# Patient Record
Sex: MALE | Race: WHITE | NOT HISPANIC OR LATINO | Employment: OTHER | URBAN - METROPOLITAN AREA
[De-identification: names, ages, dates, MRNs, and addresses within clinical notes are randomized per-mention and may not be internally consistent; named-entity substitution may affect disease eponyms.]

---

## 2024-01-03 ENCOUNTER — APPOINTMENT (EMERGENCY)
Dept: CT IMAGING | Facility: HOSPITAL | Age: 74
End: 2024-01-03
Payer: MEDICARE

## 2024-01-03 ENCOUNTER — HOSPITAL ENCOUNTER (EMERGENCY)
Facility: HOSPITAL | Age: 74
Discharge: HOME | End: 2024-01-03
Attending: EMERGENCY MEDICINE
Payer: MEDICARE

## 2024-01-03 VITALS
SYSTOLIC BLOOD PRESSURE: 135 MMHG | RESPIRATION RATE: 18 BRPM | OXYGEN SATURATION: 92 % | TEMPERATURE: 98.3 F | DIASTOLIC BLOOD PRESSURE: 93 MMHG | WEIGHT: 229.94 LBS | HEART RATE: 70 BPM | HEIGHT: 69 IN | BODY MASS INDEX: 34.06 KG/M2

## 2024-01-03 DIAGNOSIS — G89.29 CHRONIC PAIN: ICD-10-CM

## 2024-01-03 DIAGNOSIS — M54.9 LEFT-SIDED BACK PAIN: Primary | ICD-10-CM

## 2024-01-03 DIAGNOSIS — M54.10 RADICULOPATHY: ICD-10-CM

## 2024-01-03 LAB
ANION GAP SERPL CALCULATED.3IONS-SCNC: 6 MMOL/L
BASOPHILS # BLD AUTO: 0.05 THOUSANDS/ÂΜL (ref 0–0.1)
BASOPHILS NFR BLD AUTO: 1 % (ref 0–1)
BUN SERPL-MCNC: 19 MG/DL (ref 5–25)
CALCIUM SERPL-MCNC: 9 MG/DL (ref 8.4–10.2)
CHLORIDE SERPL-SCNC: 103 MMOL/L (ref 96–108)
CO2 SERPL-SCNC: 27 MMOL/L (ref 21–32)
CREAT SERPL-MCNC: 0.95 MG/DL (ref 0.6–1.3)
EOSINOPHIL # BLD AUTO: 0.27 THOUSAND/ÂΜL (ref 0–0.61)
EOSINOPHIL NFR BLD AUTO: 3 % (ref 0–6)
ERYTHROCYTE [DISTWIDTH] IN BLOOD BY AUTOMATED COUNT: 14.8 % (ref 11.6–15.1)
GFR SERPL CREATININE-BSD FRML MDRD: 80 ML/MIN/1.73SQ M
GLUCOSE SERPL-MCNC: 108 MG/DL (ref 65–140)
HCT VFR BLD AUTO: 49.8 % (ref 36.5–49.3)
HGB BLD-MCNC: 16.3 G/DL (ref 12–17)
IMM GRANULOCYTES # BLD AUTO: 0.05 THOUSAND/UL (ref 0–0.2)
IMM GRANULOCYTES NFR BLD AUTO: 1 % (ref 0–2)
LYMPHOCYTES # BLD AUTO: 1.59 THOUSANDS/ÂΜL (ref 0.6–4.47)
LYMPHOCYTES NFR BLD AUTO: 15 % (ref 14–44)
MCH RBC QN AUTO: 29.3 PG (ref 26.8–34.3)
MCHC RBC AUTO-ENTMCNC: 32.7 G/DL (ref 31.4–37.4)
MCV RBC AUTO: 89 FL (ref 82–98)
MONOCYTES # BLD AUTO: 1.05 THOUSAND/ÂΜL (ref 0.17–1.22)
MONOCYTES NFR BLD AUTO: 10 % (ref 4–12)
NEUTROPHILS # BLD AUTO: 7.79 THOUSANDS/ÂΜL (ref 1.85–7.62)
NEUTS SEG NFR BLD AUTO: 70 % (ref 43–75)
NRBC BLD AUTO-RTO: 0 /100 WBCS
PLATELET # BLD AUTO: 181 THOUSANDS/UL (ref 149–390)
PMV BLD AUTO: 10.9 FL (ref 8.9–12.7)
POTASSIUM SERPL-SCNC: 4.6 MMOL/L (ref 3.5–5.3)
RBC # BLD AUTO: 5.57 MILLION/UL (ref 3.88–5.62)
SODIUM SERPL-SCNC: 136 MMOL/L (ref 135–147)
WBC # BLD AUTO: 10.8 THOUSAND/UL (ref 4.31–10.16)

## 2024-01-03 PROCEDURE — 36415 COLL VENOUS BLD VENIPUNCTURE: CPT | Performed by: EMERGENCY MEDICINE

## 2024-01-03 PROCEDURE — 70498 CT ANGIOGRAPHY NECK: CPT

## 2024-01-03 PROCEDURE — 72131 CT LUMBAR SPINE W/O DYE: CPT

## 2024-01-03 PROCEDURE — 70496 CT ANGIOGRAPHY HEAD: CPT

## 2024-01-03 PROCEDURE — G1004 CDSM NDSC: HCPCS

## 2024-01-03 PROCEDURE — 96374 THER/PROPH/DIAG INJ IV PUSH: CPT

## 2024-01-03 PROCEDURE — 99283 EMERGENCY DEPT VISIT LOW MDM: CPT

## 2024-01-03 PROCEDURE — 99284 EMERGENCY DEPT VISIT MOD MDM: CPT | Performed by: EMERGENCY MEDICINE

## 2024-01-03 PROCEDURE — 85025 COMPLETE CBC W/AUTO DIFF WBC: CPT | Performed by: EMERGENCY MEDICINE

## 2024-01-03 PROCEDURE — 80048 BASIC METABOLIC PNL TOTAL CA: CPT | Performed by: EMERGENCY MEDICINE

## 2024-01-03 RX ORDER — KETOROLAC TROMETHAMINE 30 MG/ML
30 INJECTION, SOLUTION INTRAMUSCULAR; INTRAVENOUS ONCE
Status: DISCONTINUED | OUTPATIENT
Start: 2024-01-03 | End: 2024-01-03

## 2024-01-03 RX ORDER — KETOROLAC TROMETHAMINE 30 MG/ML
30 INJECTION, SOLUTION INTRAMUSCULAR; INTRAVENOUS ONCE
Status: COMPLETED | OUTPATIENT
Start: 2024-01-03 | End: 2024-01-03

## 2024-01-03 RX ORDER — DIAZEPAM 5 MG/1
5 TABLET ORAL ONCE
Status: COMPLETED | OUTPATIENT
Start: 2024-01-03 | End: 2024-01-03

## 2024-01-03 RX ORDER — ACETAMINOPHEN 325 MG/1
975 TABLET ORAL ONCE
Status: COMPLETED | OUTPATIENT
Start: 2024-01-03 | End: 2024-01-03

## 2024-01-03 RX ORDER — LIDOCAINE 50 MG/G
1 PATCH TOPICAL ONCE
Status: DISCONTINUED | OUTPATIENT
Start: 2024-01-03 | End: 2024-01-03 | Stop reason: HOSPADM

## 2024-01-03 RX ADMIN — IOHEXOL 85 ML: 350 INJECTION, SOLUTION INTRAVENOUS at 07:25

## 2024-01-03 RX ADMIN — LIDOCAINE 1 PATCH: 700 PATCH TOPICAL at 05:37

## 2024-01-03 RX ADMIN — KETOROLAC TROMETHAMINE 30 MG: 30 INJECTION, SOLUTION INTRAMUSCULAR at 08:57

## 2024-01-03 RX ADMIN — DIAZEPAM 5 MG: 5 TABLET ORAL at 05:37

## 2024-01-03 RX ADMIN — ACETAMINOPHEN 975 MG: 325 TABLET, FILM COATED ORAL at 05:37

## 2024-01-03 NOTE — ED CARE HANDOFF
Emergency Department Sign Out Note        Sign out and transfer of care from Dr. Paula. See Separate Emergency Department note.     The patient, Beto Sandy, was evaluated by the previous provider for chronic pian.    Workup Completed:  CBC and BMP    ED Course / Workup Pending (followup):  Pending CT lumbar spine and CTA head and neck          ED Course as of 01/05/24 1200 Wed Jan 03, 2024   0653 SO: 72 y/o male with hx of HTN, bipolar, back pain. Presents from SNF for chronic left sided pain. Imaging pending. If normal can discharge back to SNF.    0821 CTA head and neck with and without contrast  No acute intracranial pathology. Mild chronic microangiopathy.  No significant stenosis of the cervical carotid or vertebral arteries.  No significant intracranial stenosis, large vessel occlusion or aneurysm.     0833 CT lumbar spine without contrast  No acute fracture. Severe multilevel degenerative spondylosis similar to outside report for outside CT.   0845 Patient reevaluated and updated on the results of all diagnostic testing.  Patient reports continued back pain.  No red flag signs.  Physical exam is unremarkable.  Will give the patient a dose of Toradol and discharge with outpatient follow-up and return precautions.  Patient is in agreement with the plan.             Procedures  Medical Decision Making  Amount and/or Complexity of Data Reviewed  Labs: ordered.  Radiology: ordered. Decision-making details documented in ED Course.    Risk  OTC drugs.  Prescription drug management.            Disposition  Final diagnoses:   Left-sided back pain   Radiculopathy   Chronic pain     Time reflects when diagnosis was documented in both MDM as applicable and the Disposition within this note       Time User Action Codes Description Comment    1/3/2024  6:47 AM Eduard Paula [M54.9] Left-sided back pain     1/3/2024  6:47 AM Eduard Paula [M54.10] Radiculopathy     1/3/2024  6:47 AM Eduard Paula [G89.29]  Chronic pain           ED Disposition       ED Disposition   Discharge    Condition   Stable    Date/Time   Wed Brett 3, 2024  8:35 AM    Rikki Sandy discharge to home/self care.                   Follow-up Information       Follow up With Specialties Details Why Contact Info Additional Information    [facility] Emergency Department Emergency Medicine Go to  If symptoms worsen [address] [place] [id number]  [phone] [facility] Emergency Department, [address] [place] [phone]          There are no discharge medications for this patient.    No discharge procedures on file.       ED Provider  Electronically Signed by     Adan Lovett MD  01/05/24 1200

## 2024-01-03 NOTE — ED PROVIDER NOTES
"History  Chief Complaint   Patient presents with    Pain     Pt called EMS from his SNF for stroke-like symptoms. On arrival, EMS reports no signs or symptoms of stroke, and pt only reports generalized L sided chronic pain. Pt has history of L sided generalized pin, at baseline. Pt states his pain is currently \"a 10/10, it's always a 10/10.\"      73-year-old male with hx of HTN, HLD, bipolar disorder, schizophrenia, mild intellectual disability, CAD, seizure disorder, TIA, spinal stenosis, and LLE radiculopathy presents to the ED via EMS from nursing facility for evaluation of left-sided body pain. Per EMS report, the patient is currently in a nursing facility, however he called 911 for evaluation due to reported stroke-like symptoms.  When EMS arrived, the nursing facility staff were unaware that the patient had called 911 and on EMS evaluation the patient reported left-sided body pain that is chronic in nature and reported no focal weakness, numbness, or other stroke-like symptoms.  Review of the patient's electronic medical record reveals that he was recently admitted at [facility] for similar symptoms; during that admission he was evaluated by neurosurgery (recommended PT/OT and no surgery for his spinal stenosis and LLE radiculopathy) as well as cardiology and neurology (stroke-like symptoms, work up was negative for acute CVA and rule out).  Patient states that he has been having pain on the left side of the body from his head to his feet and that he rates it 10/10, noting that \"it is always a 10/10.\"  His pain is worse with movement and with raising of his left leg.  He denies any new numbness or weakness.  No fever, chills, cough, dyspnea, chest pain, abdominal pain, nausea, vomiting, diarrhea, bowel retention/incontinence, urinary retention/incontinence, rashes, headache, dizziness, or visual changes.        None       History reviewed. No pertinent past medical history.    History reviewed. No " pertinent surgical history.    History reviewed. No pertinent family history.  I have reviewed and agree with the history as documented.    E-Cigarette/Vaping     E-Cigarette/Vaping Substances    Nicotine No     THC No     CBD No     Flavoring No     Other No     Unknown No      Social History     Tobacco Use    Smoking status: Former     Types: Cigarettes    Smokeless tobacco: Never   Substance Use Topics    Alcohol use: Not Currently    Drug use: Not Currently     Types: Marijuana       Review of Systems   Constitutional:  Negative for chills and fever.   HENT:  Negative for congestion, rhinorrhea and sore throat.    Respiratory:  Negative for cough and shortness of breath.    Cardiovascular:  Negative for chest pain and palpitations.   Gastrointestinal:  Negative for abdominal pain, diarrhea, nausea and vomiting.   Genitourinary:  Negative for dysuria and hematuria.   Musculoskeletal:  Positive for back pain and myalgias. Negative for neck pain.   Neurological:  Negative for weakness, light-headedness, numbness and headaches.   All other systems reviewed and are negative.      Physical Exam  Physical Exam  Vitals and nursing note reviewed.   Constitutional:       General: He is not in acute distress.     Appearance: Normal appearance. He is obese. He is not ill-appearing.   HENT:      Head: Normocephalic and atraumatic.      Right Ear: External ear normal.      Left Ear: External ear normal.      Nose: Nose normal.      Mouth/Throat:      Mouth: Mucous membranes are moist.      Pharynx: Oropharynx is clear. No oropharyngeal exudate or posterior oropharyngeal erythema.   Eyes:      Extraocular Movements: Extraocular movements intact.      Conjunctiva/sclera: Conjunctivae normal.      Pupils: Pupils are equal, round, and reactive to light.   Cardiovascular:      Rate and Rhythm: Normal rate and regular rhythm.      Pulses: Normal pulses.      Heart sounds: Normal heart sounds.   Pulmonary:      Effort: Pulmonary  effort is normal. No respiratory distress.      Breath sounds: Normal breath sounds. No wheezing or rales.   Abdominal:      General: Abdomen is flat. Bowel sounds are normal. There is no distension.      Palpations: Abdomen is soft.      Tenderness: There is no abdominal tenderness. There is no right CVA tenderness, left CVA tenderness or guarding.   Musculoskeletal:         General: Tenderness present. No swelling or deformity. Normal range of motion.      Cervical back: Normal range of motion and neck supple. No tenderness.      Comments: Left-sided paralumbar spinal muscle tenderness to palpation with positive straight leg raise and description of radiculopathy radiating down the left leg.  Patient also reports midline lumbar spine tenderness palpation, no step-offs or deformity on exam.  Pelvis is stable to compression bilaterally. Neurovascularly intact distally.    Skin:     General: Skin is warm and dry.   Neurological:      General: No focal deficit present.      Mental Status: He is alert and oriented to person, place, and time.      Cranial Nerves: No cranial nerve deficit.      Sensory: No sensory deficit.      Motor: No weakness.      Comments: The patient is awake, oriented to person, place, and time.  Moving all 4 extremities spontaneously.  No facial asymmetry.  Clear speech without dysarthria.  Cranial nerves II-XII intact and symmetric. NIH SS 0.         Vital Signs  ED Triage Vitals   Temperature Pulse Respirations Blood Pressure SpO2   01/03/24 0453 01/03/24 0453 01/03/24 0453 01/03/24 0453 01/03/24 0453   98.3 °F (36.8 °C) 70 18 135/93 92 %      Temp Source Heart Rate Source Patient Position - Orthostatic VS BP Location FiO2 (%)   01/03/24 0453 01/03/24 0453 01/03/24 0453 01/03/24 0453 --   Oral Monitor Lying Left arm       Pain Score       01/03/24 0537       10 - Worst Possible Pain           Vitals:    01/03/24 0453   BP: 135/93   Pulse: 70   Patient Position - Orthostatic VS: Lying          Visual Acuity      ED Medications  Medications   lidocaine (LIDODERM) 5 % patch 1 patch (1 patch Topical Medication Applied 1/3/24 0537)   acetaminophen (TYLENOL) tablet 975 mg (975 mg Oral Given 1/3/24 0537)   diazepam (VALIUM) tablet 5 mg (5 mg Oral Given 1/3/24 0537)       Diagnostic Studies  Results Reviewed       Procedure Component Value Units Date/Time    Basic metabolic panel [[id number]] Collected: 01/03/24 0611    Lab Status: Final result Specimen: Blood from Arm, Left Updated: 01/03/24 0700     Sodium 136 mmol/L      Potassium 4.6 mmol/L      Chloride 103 mmol/L      CO2 27 mmol/L      ANION GAP 6 mmol/L      BUN 19 mg/dL      Creatinine 0.95 mg/dL      Glucose 108 mg/dL      Calcium 9.0 mg/dL      eGFR 80 ml/min/1.73sq m     Narrative:      National Kid[employer] Foundation guidelines for Chronic Kidney Disease (CKD):     Stage 1 with normal or high GFR (GFR > 90 mL/min/1.73 square meters)    Stage 2 Mild CKD (GFR = 60-89 mL/min/1.73 square meters)    Stage 3A Moderate CKD (GFR = 45-59 mL/min/1.73 square meters)    Stage 3B Moderate CKD (GFR = 30-44 mL/min/1.73 square meters)    Stage 4 Severe CKD (GFR = 15-29 mL/min/1.73 square meters)    Stage 5 End Stage CKD (GFR <15 mL/min/1.73 square meters)  Note: GFR calculation is accurate only with a steady state creatinine    CBC and differential [[id number]]  (Abnormal) Collected: 01/03/24 0611    Lab Status: Final result Specimen: Blood from Arm, Left Updated: 01/03/24 0622     WBC 10.80 Thousand/uL      RBC 5.57 Million/uL      Hemoglobin 16.3 g/dL      Hematocrit 49.8 %      MCV 89 fL      MCH 29.3 pg      MCHC 32.7 g/dL      RDW 14.8 %      MPV 10.9 fL      Platelets 181 Thousands/uL      nRBC 0 /100 WBCs      Neutrophils Relative 70 %      Immat GRANS % 1 %      Lymphocytes Relative 15 %      Monocytes Relative 10 %      Eosinophils Relative 3 %      Basophils Relative 1 %      Neutrophils Absolute 7.79 Thousands/µL      Immature Grans Absolute  0.05 Thousand/uL      Lymphocytes Absolute 1.59 Thousands/µL      Monocytes Absolute 1.05 Thousand/µL      Eosinophils Absolute 0.27 Thousand/µL      Basophils Absolute 0.05 Thousands/µL                    CT lumbar spine without contrast    (Results Pending)   CTA head and neck with and without contrast    (Results Pending)              Procedures  Procedures         ED Course                               SBIRT 22yo+      Flowsheet Row Most Recent Value   Initial Alcohol Screen: US AUDIT-C     1. How often do you have a drink containing alcohol? 0 Filed at: 01/03/2024 0502   2. How many drinks containing alcohol do you have on a typical day you are drinking?  0 Filed at: 01/03/2024 0502   3a. Male UNDER 65: How often do you have five or more drinks on one occasion? 0 Filed at: 01/03/2024 0502   3b. FEMALE Any Age, or MALE 65+: How often do you have 4 or more drinks on one occassion? 0 Filed at: 01/03/2024 0502   Audit-C Score 0 Filed at: 01/03/2024 0502   JUAN: How many times in the past year have you...    Used an illegal drug or used a prescription medication for non-medical reasons? Never Filed at: 01/03/2024 0502                      Medical Decision Making  73-year-old male with hx of HTN, HLD, bipolar disorder, schizophrenia, mild intellectual disability, CAD, seizure disorder, TIA, spinal stenosis, and LLE radiculopathy presents to the ED via EMS from nursing facility for evaluation of left-sided body pain. Per EMS report, the patient is currently in a nursing facility, however he called 911 for evaluation due to reported stroke-like symptoms.  When EMS arrived, the nursing facility staff were unaware that the patient had called 911 and on EMS evaluation the patient reported left-sided body pain that is chronic in nature and reported no focal weakness, numbness, or other stroke-like symptoms.  Review of the patient's electronic medical record reveals that he was recently admitted at [facility] for  "similar symptoms; during that admission he was evaluated by neurosurgery (recommended PT/OT and no surgery for his spinal stenosis and LLE radiculopathy) as well as cardiology and neurology (stroke-like symptoms, work up was negative for acute CVA and rule out).  Patient states that he has been having pain on the left side of the body from his head to his feet and that he rates it 10/10, noting that \"it is always a 10/10.\"  His pain is worse with movement and with raising of his left leg.  He denies any new numbness or weakness.  No fever, chills, cough, dyspnea, chest pain, abdominal pain, nausea, vomiting, diarrhea, bowel retention/incontinence, urinary retention/incontinence, rashes, headache, dizziness, or visual changes.    Vital signs reviewed.  Adult male lying in bed, appears in no acute distress. Left-sided paralumbar spinal muscle tenderness to palpation with positive straight leg raise and description of radiculopathy radiating down the left leg.  Patient also reports midline lumbar spine tenderness palpation, no step-offs or deformity on exam.  Pelvis is stable to compression bilaterally. Neurovascularly intact distally.  See physical exam documentation for full exam findings.  Differential diagnosis includes but is not limited to exacerbation of chronic left back pain and spinal stenosis with radiculopathy, musculoskeletal pain, compression fracture, muscle spasm, electrolyte disturbance, intracranial bleed, intracranial aneurysm. Will evaluate with labs, CTA head and neck with and without contrast, and CT lumbar spine.  Symptomatic treatment ordered with Tylenol, Lidoderm patch, and Valium.  Care for the patient was signed out at end of shift prior to imaging results and final disposition.    Amount and/or Complexity of Data Reviewed  Labs: ordered.  Radiology: ordered.    Risk  OTC drugs.  Prescription drug management.             Disposition  Final diagnoses:   Left-sided back pain   Radiculopathy "   Chronic pain     Time reflects when diagnosis was documented in both MDM as applicable and the Disposition within this note       Time User Action Codes Description Comment    1/3/2024  6:47 AM Eduard Paula [M54.9] Left-sided back pain     1/3/2024  6:47 AM Eduard Paula [M54.10] Radiculopathy     1/3/2024  6:47 AM Eduard Paula [G89.29] Chronic pain           ED Disposition       None          Follow-up Information    None         Patient's Medications    No medications on file       No discharge procedures on file.    PDMP Review       None            ED Provider  Electronically Signed by             Eduard Paula MD  01/03/24 0709

## 2024-01-03 NOTE — ED NOTES
"Pt jumping around in bed stating \"Pain! Pain! I have pain!\" Pt was reminded that he had recently been given pain relief, and pt states \"Oh, I didn't remember!\" Pt stopped the body jumping and appears the redirection was effective.     Faustina Nelson RN  01/03/24 0637    "

## 2024-01-03 NOTE — ED NOTES
EMS reports staff at [facility] had no idea pt called 911. Pt has only been at this [facility] since 12/28/23.     Faustina Nelson RN  01/03/24 0510

## 2024-01-12 ENCOUNTER — TELEPHONE (OUTPATIENT)
Age: 74
End: 2024-01-12

## 2024-01-12 NOTE — TELEPHONE ENCOUNTER
Caller: Rosy/[facility]    Doctor: na    Reason for call: Requested an appt for the patient. Wasn't sure why she was told to schedule an appt. Questioned if it was due to the ED visit 1/3/24? Rosy wasn't sure.  Stated patient already has appt schedule w/spine/pain. Will check w/nursing and cb    Call back#: [phone]

## 2024-02-07 ENCOUNTER — TELEPHONE (OUTPATIENT)
Dept: NEUROLOGY | Facility: CLINIC | Age: 74
End: 2024-02-07

## 2024-02-07 NOTE — TELEPHONE ENCOUNTER
[facility] called for patient to schedule NP appointment. Informed we need to speak with patient to ask triage questions. She will call back patient is in therapy. Also we need the diagnosis what patient needs to see for.

## 2024-08-14 ENCOUNTER — TELEPHONE (OUTPATIENT)
Dept: NEUROLOGY | Facility: CLINIC | Age: 74
End: 2024-08-14

## 2024-08-27 ENCOUNTER — CONSULT (OUTPATIENT)
Dept: NEUROLOGY | Facility: CLINIC | Age: 74
End: 2024-08-27
Payer: MEDICARE

## 2024-08-27 VITALS
SYSTOLIC BLOOD PRESSURE: 128 MMHG | HEIGHT: 69 IN | TEMPERATURE: 96.8 F | OXYGEN SATURATION: 95 % | BODY MASS INDEX: 36.43 KG/M2 | HEART RATE: 64 BPM | DIASTOLIC BLOOD PRESSURE: 78 MMHG | WEIGHT: 246 LBS

## 2024-08-27 DIAGNOSIS — R26.9 GAIT DIFFICULTY: Primary | ICD-10-CM

## 2024-08-27 DIAGNOSIS — G25.9 EXTRAPYRAMIDAL RIGIDITY: ICD-10-CM

## 2024-08-27 DIAGNOSIS — G25.2 RESTING TREMOR: ICD-10-CM

## 2024-08-27 DIAGNOSIS — R29.898 LEG WEAKNESS, BILATERAL: ICD-10-CM

## 2024-08-27 DIAGNOSIS — G40.909 SEIZURE DISORDER (HCC): ICD-10-CM

## 2024-08-27 DIAGNOSIS — G20.A1 PARKINSON'S DISEASE (TREMOR, STIFFNESS, SLOW MOTION, UNSTABLE POSTURE): ICD-10-CM

## 2024-08-27 PROCEDURE — 99205 OFFICE O/P NEW HI 60 MIN: CPT | Performed by: PSYCHIATRY & NEUROLOGY

## 2024-08-27 RX ORDER — LEVOTHYROXINE SODIUM 50 UG/1
50 TABLET ORAL DAILY
COMMUNITY
Start: 2024-08-21 | End: 2024-09-20

## 2024-08-27 RX ORDER — FENOFIBRATE 145 MG/1
145 TABLET, COATED ORAL DAILY
COMMUNITY
Start: 2024-08-21 | End: 2024-09-20

## 2024-08-27 RX ORDER — ARIPIPRAZOLE 5 MG/1
5 TABLET ORAL DAILY
COMMUNITY
Start: 2024-08-21 | End: 2024-09-20

## 2024-08-27 RX ORDER — ASPIRIN 81 MG/1
81 TABLET ORAL DAILY
COMMUNITY
Start: 2024-08-21 | End: 2024-09-20

## 2024-08-27 RX ORDER — FLUTICASONE PROPIONATE 50 MCG
100 SPRAY, SUSPENSION (ML) NASAL DAILY
COMMUNITY
Start: 2024-08-21 | End: 2024-09-20

## 2024-08-27 RX ORDER — AMLODIPINE BESYLATE 5 MG/1
5 TABLET ORAL DAILY
COMMUNITY
Start: 2024-08-21 | End: 2024-09-20

## 2024-08-27 RX ORDER — ATORVASTATIN CALCIUM 80 MG/1
80 TABLET, FILM COATED ORAL DAILY
COMMUNITY
Start: 2024-08-21 | End: 2024-09-20

## 2024-08-27 RX ORDER — CARBIDOPA AND LEVODOPA 25; 100 MG/1; MG/1
TABLET ORAL
Qty: 90 TABLET | Refills: 5 | Status: SHIPPED | OUTPATIENT
Start: 2024-08-27

## 2024-08-27 RX ORDER — PANTOPRAZOLE SODIUM 40 MG/1
40 TABLET, DELAYED RELEASE ORAL DAILY
COMMUNITY
Start: 2024-08-21 | End: 2024-09-20

## 2024-08-27 RX ORDER — GABAPENTIN 300 MG/1
300 CAPSULE ORAL 3 TIMES DAILY
COMMUNITY
Start: 2024-08-21 | End: 2024-09-20

## 2024-08-27 RX ORDER — ACETAMINOPHEN 325 MG/1
325 TABLET ORAL EVERY 4 HOURS PRN
COMMUNITY

## 2024-08-27 RX ORDER — POLYETHYLENE GLYCOL 3350 17 G/17G
17 POWDER, FOR SOLUTION ORAL DAILY
COMMUNITY
Start: 2024-08-21 | End: 2024-09-20

## 2024-08-27 RX ORDER — DOCUSATE SODIUM 100 MG/1
100 CAPSULE, LIQUID FILLED ORAL 2 TIMES DAILY
COMMUNITY
Start: 2024-08-21 | End: 2024-09-20

## 2024-08-27 RX ORDER — LIDOCAINE 50 MG/G
1 PATCH TOPICAL DAILY
COMMUNITY
Start: 2024-08-21 | End: 2024-09-20

## 2024-08-27 RX ORDER — BUSPIRONE HYDROCHLORIDE 5 MG/1
5 TABLET ORAL 2 TIMES DAILY
COMMUNITY
Start: 2024-08-21 | End: 2024-09-20

## 2024-08-27 NOTE — LETTER
August 27, 2024     Domitila BRENNAN MD  [address] [facility]  [address]  [facility] [address]    Patient: Beto Sandy   YOB: 1950   Date of Visit: 8/27/2024       Dear Dr. Brizuela:    Thank you for referring Beto Sandy to me for evaluation. Below are my notes for this consultation.    If you have questions, please do not hesitate to call me. I look forward to following your patient along with you.         Sincerely,        Braxton Lozano MD        CC: No Recipients    Braxton Lozano MD  8/27/2024  2:37 PM  Sign when Signing Visit  Outpatient Neurology History and Physical  Beto Sandy  [id number]  74 y.o.  1950          Consult: Yes    Domitila Brizuela MD      Chief Complaint   Patient presents with   • Peripheral Neuropathy   • Left-sided weakness           History Obtained from: patient and      HPI:       Beto Sandy is a 73 yo right handed  M that is brought for weakness. He has  h/o schizoaffective disorder, mild intellectual disability, seizure disorder, tia. Sometime in July 2024, patient was taken to [facility] for generalized weakness, b/l leg pain for 1 week. He was Covid + late July until early Aug. Patient doesn't know why he's here.  doesn't know either. We are going by some partial notes from [facility]. They do mention that he's been using cane lately.   He does admit to leg cramps on left side.   With observation, there is right hand resting tremor and some fasciculations in right arm.   He's on Abilify for unclear length of time.   He's had epidural injections in lower back some time ago.  He had loop recorder and it was removed last year.   He used to smoke 1 ppd until 2017.   He has a lung nodule that is 6-8mm and is asked to have f/u imaging in 1 year.       Past Medical History:   Diagnosis Date   • Asthma    • Epilepsy (HCC)    • History of cardiac arrest    • Hyperlipemia    • Hypertension    • Neuropathy    •  "Schizoaffective disorder (HCC)    • Seizures (HCC)    • Thyroid disease                Current Outpatient Medications on File Prior to Visit   Medication Sig Dispense Refill   • acetaminophen (Tylenol) 325 mg tablet Take 325 mg by mouth every 4 (four) hours as needed     • Albuterol Sulfate 108 (90 Base) MCG/ACT AEPB Inhale 2 puffs every 6 (six) hours as needed     • amLODIPine (NORVASC) 5 mg tablet Take 5 mg by mouth daily     • ARIPiprazole (ABILIFY) 5 mg tablet Take 5 mg by mouth daily     • aspirin (ECOTRIN LOW STRENGTH) 81 mg EC tablet Take 81 mg by mouth daily     • atorvastatin (LIPITOR) 80 mg tablet Take 80 mg by mouth daily     • busPIRone (BUSPAR) 5 mg tablet Take 5 mg by mouth 2 (two) times a day     • Cyanocobalamin 1000 MCG SUBL Place 1,000 mcg under the tongue every other day     • docusate sodium (COLACE) 100 mg capsule Take 100 mg by mouth 2 (two) times a day     • fenofibrate (TRICOR) 145 mg tablet Take 145 mg by mouth daily     • fluticasone (FLONASE) 50 mcg/act nasal spray 100 mcg into each nostril daily     • gabapentin (NEURONTIN) 300 mg capsule Take 300 mg by mouth Three times a day     • levothyroxine 50 mcg tablet Take 50 mcg by mouth daily     • lidocaine (LIDODERM) 5 % Place 1 patch on the skin daily     • Multiple Vitamin (MULTIVITAMIN ADULT PO) Take 1 tablet by mouth daily MULTIVITAMIN (THERAGRAN) TABLET     • pantoprazole (PROTONIX) 40 mg tablet Take 40 mg by mouth daily     • polyethylene glycol (MIRALAX) 17 g packet Take 17 g by mouth daily POLYETHYLENE GLYCOL (MIRALAX) 17 GRAM/DOSE POWDER     • sertraline (ZOLOFT) 50 mg tablet Take 50 mg by mouth daily       No current facility-administered medications on file prior to visit.       Allergies   Allergen Reactions   • Lactose Intolerance (Gi) - Food Allergy Other (See Comments)     Pt states he gets \"phlegm\" as a reaction to drinking milk.   • Shellfish Allergy - Food Allergy GI Intolerance         History reviewed. No pertinent family " "history.             Past Surgical History:   Procedure Laterality Date   • COLONOSCOPY     • TONSILLECTOMY             Social History     Socioeconomic History   • Marital status: Single     Spouse name: Not on file   • Number of children: Not on file   • Years of education: Not on file   • Highest education level: Not on file   Occupational History   • Not on file   Tobacco Use   • Smoking status: Former     Types: Cigarettes   • Smokeless tobacco: Never   Vaping Use   • Vaping status: Never Used   Substance and Sexual Activity   • Alcohol use: Not Currently   • Drug use: Not Currently     Types: Marijuana   • Sexual activity: Not on file   Other Topics Concern   • Not on file   Social History Narrative   • Not on file     Social Determinants of Health     Financial Resource Strain: Not on file   Food Insecurity: Not on file   Transportation Needs: Not on file   Physical Activity: Not on file   Stress: Not on file   Social Connections: Not on file   Intimate Partner Violence: Not on file   Housing Stability: Not on file       Review of Systems  Refer to positive review of systems in HPI  Constitutional- No fever  Eyes- No visual change  ENT- Hearing normal  CV- No chest pain  Resp- No Shortness of breath  GI- No diarrhea  - Bladder normal  MS- No Arthritis   Skin- No rash  Psych- No depression  Endo- No DM  Heme- No nodes    PHYSICAL EXAM:    Vitals:    08/27/24 1007   BP: 128/78   BP Location: Left arm   Patient Position: Sitting   Cuff Size: Standard   Pulse: 64   Temp: (!) 96.8 °F (36 °C)   TempSrc: Tympanic   SpO2: 95%   Weight: 112 kg (246 lb)   Height: 5' 9\" (1.753 m)         Appearance: No Acute Distress  Ophthalmoscopic: Disc Flat, Normal fundus  Carotid/Heart/Peripheral Vascular: No Bruits, RRR  Orientation: Awake, Alert, and Oriented x 3  Mental status:  Memory: Registation 3/3 Recall 2/3  Attention: Normal  Knowledge: Appropriate  Language: No aphasia  Speech: No dysarthria  Cranial Nerves:  2 No " Visual Defect on Confrontation; Pupils round, equal, reactive to light  3,4,6 Extraocular Movements Intact; no nystagmus  5 Facial Sensation Intact  7 No facial asymmetry  8 Intact hearing  9,10 Palate symmetric, normal gag  11 Good shoulder shrug  12 Tongue Midline  Gait: Stable, No ataxia, can perform tandem walking  Coordination: No ataxia with finger to nose testing and heel to shin testing  Sensory: Intact, Symmetric to Pinprick, Light Touch, Vibration, and Joint Position  Muscle Tone: rigidity in LE.   Muscle exam  Arm Right Left Leg Right Left   Deltoid 5/5 5/5 Iliopsoas 5/5 5/5   Biceps 5/5 5/5 Quads 5/5 5/5   Triceps 5/5 5/5 Hamstrings 5/5 5/5   Wrist Extension 5/5 5/5 Ankle Dorsi Flexion 5/5 5/5   Wrist Flexion 5/5 5/5 Ankle Plantar Flexion 5/5 5/5   Interossei 5/5 5/5 Ankle Eversion 5/5 5/5   APB 5/5 5/5 Ankle Inversion 5/5 5/5     Fasciculations over RUE.     Reflexes   RJ BJ TJ KJ AJ Plantars Cobian's   Right 2+ 2+ 2+ 2+ 1+ Downgoing Not present   Left 2+ 2+ 2+ 1+ 1+ Downgoing Not present         Personal review of          None relevant     Assessment/Plan:     1. Gait difficulty  MRI brain with and without contrast    MRI cervical spine wo contrast    CK    Sedimentation rate, automated    Aldolase    Protein electrophoresis, serum    Protein electrophoresis, urine    Copper Level      2. Resting tremor        3. Extrapyramidal rigidity        4. Seizure disorder (HCC)        5. Parkinson's disease (tremor, stiffness, slow motion, unstable posture)  carbidopa-levodopa (Sinemet)  mg per tablet    Ambulatory Referral to Physical Therapy      6. Leg weakness, bilateral  CK    Sedimentation rate, automated    Aldolase    Protein electrophoresis, serum    Protein electrophoresis, urine    Copper Level            Patient is not a good historian and accompanying person doesn't know much about him. From what I can gather based on observation is that he had mild resting tremor in right arm and has been  slowing down. He is found to have some LE rigidity. He appears to have some parkinsonian features whether they are secondary to long term use of abilify or primary, can't say.  Will start him on sinemet and monitor periodically.   He also has right arm fasciculations without atrophy or weakness. Will get one time MRI brain and cervical spine to r/o structural etiology of his worsening gait.   Will look for some reversible causes as well.             Counseling Documentation:  The patient and/or patient's family were  counseled regarding diagnostic results. Instructions for management,risk factor reductions,prognosis of disease were discussed. Patient and family were educated regarding impressions,risks and benefits of treatment options,importance of compliance with treatment.        Total time of encounter: 70 min   More than 50% of time was spent in counseling and coordination of care of patient.     Braxton Lozano M.D.  [facility]  [address] [place] [id number]

## 2024-08-27 NOTE — PROGRESS NOTES
Outpatient Neurology History and Physical  Beto Sandy  [id number]  74 y.o.  1950          Consult: Yes    Domitila Brizuela MD      Chief Complaint   Patient presents with   • Peripheral Neuropathy   • Left-sided weakness           History Obtained from: patient and      HPI:       Beto Sandy is a 73 yo right handed  M that is brought for weakness. He has  h/o schizoaffective disorder, mild intellectual disability, seizure disorder, tia. Sometime in July 2024, patient was taken to [facility] for generalized weakness, b/l leg pain for 1 week. He was Covid + late July until early Aug. Patient doesn't know why he's here.  doesn't know either. We are going by some partial notes from [facility]. They do mention that he's been using cane lately.   He does admit to leg cramps on left side.   With observation, there is right hand resting tremor and some fasciculations in right arm.   He's on Abilify for unclear length of time.   He's had epidural injections in lower back some time ago.  He had loop recorder and it was removed last year.   He used to smoke 1 ppd until 2017.   He has a lung nodule that is 6-8mm and is asked to have f/u imaging in 1 year.       Past Medical History:   Diagnosis Date   • Asthma    • Epilepsy (HCC)    • History of cardiac arrest    • Hyperlipemia    • Hypertension    • Neuropathy    • Schizoaffective disorder ([facility])    • Seizures ([facility])    • Thyroid disease                Current Outpatient Medications on File Prior to Visit   Medication Sig Dispense Refill   • acetaminophen (Tylenol) 325 mg tablet Take 325 mg by mouth every 4 (four) hours as needed     • Albuterol Sulfate 108 (90 Base) MCG/ACT AEPB Inhale 2 puffs every 6 (six) hours as needed     • amLODIPine (NORVASC) 5 mg tablet Take 5 mg by mouth daily     • ARIPiprazole (ABILIFY) 5 mg tablet Take 5 mg by mouth daily     • aspirin (ECOTRIN LOW STRENGTH) 81 mg EC tablet Take  "81 mg by mouth daily     • atorvastatin (LIPITOR) 80 mg tablet Take 80 mg by mouth daily     • busPIRone (BUSPAR) 5 mg tablet Take 5 mg by mouth 2 (two) times a day     • Cyanocobalamin 1000 MCG SUBL Place 1,000 mcg under the tongue every other day     • docusate sodium (COLACE) 100 mg capsule Take 100 mg by mouth 2 (two) times a day     • fenofibrate (TRICOR) 145 mg tablet Take 145 mg by mouth daily     • fluticasone (FLONASE) 50 mcg/act nasal spray 100 mcg into each nostril daily     • gabapentin (NEURONTIN) 300 mg capsule Take 300 mg by mouth Three times a day     • levothyroxine 50 mcg tablet Take 50 mcg by mouth daily     • lidocaine (LIDODERM) 5 % Place 1 patch on the skin daily     • Multiple Vitamin (MULTIVITAMIN ADULT PO) Take 1 tablet by mouth daily MULTIVITAMIN (THERAGRAN) TABLET     • pantoprazole (PROTONIX) 40 mg tablet Take 40 mg by mouth daily     • polyethylene glycol (MIRALAX) 17 g packet Take 17 g by mouth daily POLYETHYLENE GLYCOL (MIRALAX) 17 GRAM/DOSE POWDER     • sertraline (ZOLOFT) 50 mg tablet Take 50 mg by mouth daily       No current facility-administered medications on file prior to visit.       Allergies   Allergen Reactions   • Lactose Intolerance (Gi) - Food Allergy Other (See Comments)     Pt states he gets \"phlegm\" as a reaction to drinking milk.   • Shellfish Allergy - Food Allergy GI Intolerance         History reviewed. No pertinent family history.             Past Surgical History:   Procedure Laterality Date   • COLONOSCOPY     • TONSILLECTOMY             Social History     Socioeconomic History   • Marital status: Single     Spouse name: Not on file   • Number of children: Not on file   • Years of education: Not on file   • Highest education level: Not on file   Occupational History   • Not on file   Tobacco Use   • Smoking status: Former     Types: Cigarettes   • Smokeless tobacco: Never   Vaping Use   • Vaping status: Never Used   Substance and Sexual Activity   • Alcohol use: " "Not Currently   • Drug use: Not Currently     Types: Marijuana   • Sexual activity: Not on file   Other Topics Concern   • Not on file   Social History Narrative   • Not on file     Social Determinants of Health     Financial Resource Strain: Not on file   Food Insecurity: Not on file   Transportation Needs: Not on file   Physical Activity: Not on file   Stress: Not on file   Social Connections: Not on file   Intimate Partner Violence: Not on file   Housing Stability: Not on file       Review of Systems  Refer to positive review of systems in HPI  Constitutional- No fever  Eyes- No visual change  ENT- Hearing normal  CV- No chest pain  Resp- No Shortness of breath  GI- No diarrhea  - Bladder normal  MS- No Arthritis   Skin- No rash  Psych- No depression  Endo- No DM  Heme- No nodes    PHYSICAL EXAM:    Vitals:    08/27/24 1007   BP: 128/78   BP Location: Left arm   Patient Position: Sitting   Cuff Size: Standard   Pulse: 64   Temp: (!) 96.8 °F (36 °C)   TempSrc: Tympanic   SpO2: 95%   Weight: 112 kg (246 lb)   Height: 5' 9\" (1.753 m)         Appearance: No Acute Distress  Ophthalmoscopic: Disc Flat, Normal fundus  Carotid/Heart/Peripheral Vascular: No Bruits, RRR  Orientation: Awake, Alert, and Oriented x 3  Mental status:  Memory: Registation 3/3 Recall 2/3  Attention: Normal  Knowledge: Appropriate  Language: No aphasia  Speech: No dysarthria  Cranial Nerves:  2 No Visual Defect on Confrontation; Pupils round, equal, reactive to light  3,4,6 Extraocular Movements Intact; no nystagmus  5 Facial Sensation Intact  7 No facial asymmetry  8 Intact hearing  9,10 Palate symmetric, normal gag  11 Good shoulder shrug  12 Tongue Midline  Gait: Stable, No ataxia, can perform tandem walking  Coordination: No ataxia with finger to nose testing and heel to shin testing  Sensory: Intact, Symmetric to Pinprick, Light Touch, Vibration, and Joint Position  Muscle Tone: rigidity in LE.   Muscle exam  Arm Right Left Leg Right Left "   Deltoid 5/5 5/5 Iliopsoas 5/5 5/5   Biceps 5/5 5/5 Quads 5/5 5/5   Triceps 5/5 5/5 Hamstrings 5/5 5/5   Wrist Extension 5/5 5/5 Ankle Dorsi Flexion 5/5 5/5   Wrist Flexion 5/5 5/5 Ankle Plantar Flexion 5/5 5/5   Interossei 5/5 5/5 Ankle Eversion 5/5 5/5   APB 5/5 5/5 Ankle Inversion 5/5 5/5     Fasciculations over RUE.     Reflexes   RJ BJ TJ KJ AJ Plantars Cobian's   Right 2+ 2+ 2+ 2+ 1+ Downgoing Not present   Left 2+ 2+ 2+ 1+ 1+ Downgoing Not present         Personal review of          None relevant     Assessment/Plan:     1. Gait difficulty  MRI brain with and without contrast    MRI cervical spine wo contrast    CK    Sedimentation rate, automated    Aldolase    Protein electrophoresis, serum    Protein electrophoresis, urine    Copper Level      2. Resting tremor        3. Extrapyramidal rigidity        4. Seizure disorder (HCC)        5. Parkinson's disease (tremor, stiffness, slow motion, unstable posture)  carbidopa-levodopa (Sinemet)  mg per tablet    Ambulatory Referral to Physical Therapy      6. Leg weakness, bilateral  CK    Sedimentation rate, automated    Aldolase    Protein electrophoresis, serum    Protein electrophoresis, urine    Copper Level            Patient is not a good historian and accompanying person doesn't know much about him. From what I can gather based on observation is that he had mild resting tremor in right arm and has been slowing down. He is found to have some LE rigidity. He appears to have some parkinsonian features whether they are secondary to long term use of abilify or primary, can't say.  Will start him on sinemet and monitor periodically.   He also has right arm fasciculations without atrophy or weakness. Will get one time MRI brain and cervical spine to r/o structural etiology of his worsening gait.   Will look for some reversible causes as well.             Counseling Documentation:  The patient and/or patient's family were  counseled regarding diagnostic  results. Instructions for management,risk factor reductions,prognosis of disease were discussed. Patient and family were educated regarding impressions,risks and benefits of treatment options,importance of compliance with treatment.        Total time of encounter: 70 min   More than 50% of time was spent in counseling and coordination of care of patient.     Braxton Lozano M.D.  [facility]  [address] [place] [id number]

## 2024-10-09 ENCOUNTER — TELEPHONE (OUTPATIENT)
Age: 74
End: 2024-10-09

## 2024-10-09 NOTE — TELEPHONE ENCOUNTER
"Pts  Anaya called stating that the pt \"almost fell\". Pt was seen his PCP yesterday who recommended a f/u appt with Dr Lozano as soon as possible for parkinson's.      Upon review of schedule, advised no open slots until 6/3/2025. Pt is currently scheduled on 1/8 at 130pm. The PCP was not aware that pt is established with Dr Lozano and had an f/u appt already scheduled per .      Added pt to wait list.      Advised I would send a message to inform Dr Lozano.  "

## 2024-10-15 ENCOUNTER — HOSPITAL ENCOUNTER (OUTPATIENT)
Dept: RADIOLOGY | Facility: HOSPITAL | Age: 74
Discharge: HOME | End: 2024-10-15
Attending: PSYCHIATRY & NEUROLOGY
Payer: MEDICARE

## 2024-10-15 DIAGNOSIS — R26.9 GAIT DIFFICULTY: ICD-10-CM

## 2024-10-15 PROCEDURE — 72141 MRI NECK SPINE W/O DYE: CPT

## 2024-10-15 PROCEDURE — A9585 GADOBUTROL INJECTION: HCPCS | Performed by: PSYCHIATRY & NEUROLOGY

## 2024-10-15 PROCEDURE — 70553 MRI BRAIN STEM W/O & W/DYE: CPT

## 2024-10-15 RX ORDER — GADOBUTROL 604.72 MG/ML
11 INJECTION INTRAVENOUS
Status: COMPLETED | OUTPATIENT
Start: 2024-10-15 | End: 2024-10-15

## 2024-10-15 RX ADMIN — GADOBUTROL 11 ML: 604.72 INJECTION INTRAVENOUS at 12:18

## 2024-10-17 NOTE — RESULT ENCOUNTER NOTE
Please call the patient regarding his abnormal result.  His cervical MRI shows arthritis at c2-5 levels with some narrowing. It's not significant and wouldn't explain her gait. No change to plan discussed in August visit.   If he complains of neck pain, then we can refer him to pain management.

## 2024-10-18 ENCOUNTER — TELEPHONE (OUTPATIENT)
Dept: NEUROLOGY | Facility: CLINIC | Age: 74
End: 2024-10-18

## 2024-10-18 NOTE — TELEPHONE ENCOUNTER
Left message with  manager to have the patient contact the office to discuss his MRI results.    ----- Message from Braxton Lozano MD sent at 10/17/2024  5:35 PM EDT -----  Please call the patient regarding his abnormal result.  His cervical MRI shows arthritis at c2-5 levels with some narrowing. It's not significant and wouldn't explain her gait. No change to plan discussed in August visit.   If he complains of neck pain, then we can refer him to pain management.

## 2024-10-22 ENCOUNTER — TELEPHONE (OUTPATIENT)
Dept: NEUROLOGY | Facility: CLINIC | Age: 74
End: 2024-10-22

## 2024-10-22 NOTE — TELEPHONE ENCOUNTER
Spoke to the patient and informed him of his MRI results below.    ----- Message from Braxton Lozano MD sent at 10/17/2024  5:35 PM EDT -----  Please call the patient regarding his abnormal result.  His cervical MRI shows arthritis at c2-5 levels with some narrowing. It's not significant and wouldn't explain her gait. No change to plan discussed in August visit.   If he complains of neck pain, then we can refer him to pain management.     The patient states that he had acupuncture done in the past that has help in the past, the patient is requesting to have acupuncture done again for the pain.

## 2024-10-23 NOTE — TELEPHONE ENCOUNTER
Spoke to  and rep informed me that the patient is not sure of who did his acupuncture in the past and is requesting an acupuncture in the area.

## 2024-10-25 NOTE — TELEPHONE ENCOUNTER
Spoke to Ventura from [facility] who informed me that he will try to locate the acupuncture location for the referral and call back with the information.

## 2024-11-13 ENCOUNTER — TELEPHONE (OUTPATIENT)
Dept: NEUROLOGY | Facility: CLINIC | Age: 74
End: 2024-11-13

## 2025-03-22 DIAGNOSIS — G20.A1 PARKINSON'S DISEASE (TREMOR, STIFFNESS, SLOW MOTION, UNSTABLE POSTURE) (HCC): ICD-10-CM

## 2025-03-24 RX ORDER — CARBIDOPA AND LEVODOPA 25; 100 MG/1; MG/1
TABLET ORAL
Qty: 90 TABLET | Refills: 11 | Status: SHIPPED | OUTPATIENT
Start: 2025-03-24